# Patient Record
(demographics unavailable — no encounter records)

---

## 2017-12-20 NOTE — RAD
PORTABLE CHEST

12/20/17

 

An AP portable film at 1914 is compared with a prior study of 6/11/09. 

 

The depth of inspiration is shallow which makes it difficult to see the lung bases well. The heart is
 normal in size and the lungs are clear. There is no vascular congestion, edema, or pleural effusion.
 No focal pulmonary infiltrate was seen.

 

IMPRESSION:  

No acute finding.

 

POS: HOME